# Patient Record
(demographics unavailable — no encounter records)

---

## 2025-01-09 NOTE — HISTORY OF PRESENT ILLNESS
[FreeTextEntry1] : NPV acne [de-identified] : Ms. RENÉ DANGELO is a 19-year-old female who presents for:    1) Acne, face - Duration: years - Symptoms: none - Prior tx: LaRoche Posay cleanser, Cerave moisturizer   2) Excessive sweat, full body  - On hands, underarms, forehead - Tried multiple topicals and failed - Reports she had Botox to underarms that didn't help   3) Hand eczema, bilateral - Hasn't used her steroid ointments at home - Reports not as bad as her usual    Derm Hx: none; no personal hx of skin cancer Family Hx: no family hx of skin cancer Social Hx: sophomore in college

## 2025-01-09 NOTE — PHYSICAL EXAM
[Alert] : alert [Oriented x 3] : ~L oriented x 3 [Well Nourished] : well nourished [Conjunctiva Non-injected] : conjunctiva non-injected [No Visual Lymphadenopathy] : no visual  lymphadenopathy [No Clubbing] : no clubbing [No Edema] : no edema [No Bromhidrosis] : no bromhidrosis [No Chromhidrosis] : no chromhidrosis [Declined] : declined [FreeTextEntry3] : Focused exam only (see below) per patient request: - Several inflammatory papules and closed comedones on face - Scattered inflammatory papules on back and shoulders  - Beads of sweat on bilateral palms  - Thin erythematous scaly plaques on bilateral palms

## 2025-01-09 NOTE — ASSESSMENT
[FreeTextEntry1] : #Acne vulgaris, face, back, shoulders, mild, comedonal and hormonal, chronic, flaring #Post-inflammatory hyperpigmentation  - START tretinoin 0.025% cream to face 3 times per week at night and then increase to nightly as tolerated - discussed side effect of irritation and educated pt on importance of using facial moisturizer concomitantly - START OTC benzoyl peroxide 4% wash to affected areas once daily - Discussed side effects including skin dryness, bleaching towels and skin - START clindamycin phosphate 1% lotion to affected areas of acne once daily- SED - Discussed spironolactone - pt defers at this time and prefers to start with topicals   #Hand dermatitis, bilateral, chronic, flaring - START triamcinolone 0.1% ointment twice daily to affected areas for up to 2 weeks at a time with 1 week break between - SED  #Hyperhidrosis, diffuse, chronic, flaring, for several years Failed topicals (Carpe, Certain-Dri, Qbrexza), affecting quality of life  - START glycopyrrolate 2 mg oral tablet once daily - SED including but not limited to constipation and urinary retention, visual changes  RTC 3 months

## 2025-01-09 NOTE — HISTORY OF PRESENT ILLNESS
[FreeTextEntry1] : NPV acne [de-identified] : Ms. RENÉ DANGELO is a 19-year-old female who presents for:    1) Acne, face - Duration: years - Symptoms: none - Prior tx: LaRoche Posay cleanser, Cerave moisturizer   2) Excessive sweat, full body  - On hands, underarms, forehead - Tried multiple topicals and failed - Reports she had Botox to underarms that didn't help   3) Hand eczema, bilateral - Hasn't used her steroid ointments at home - Reports not as bad as her usual    Derm Hx: none; no personal hx of skin cancer Family Hx: no family hx of skin cancer Social Hx: sophomore in college